# Patient Record
Sex: MALE | Race: WHITE | Employment: UNEMPLOYED | URBAN - METROPOLITAN AREA
[De-identification: names, ages, dates, MRNs, and addresses within clinical notes are randomized per-mention and may not be internally consistent; named-entity substitution may affect disease eponyms.]

---

## 2018-05-22 ENCOUNTER — OFFICE VISIT (OUTPATIENT)
Dept: INTERNAL MEDICINE CLINIC | Age: 18
End: 2018-05-22

## 2018-05-22 ENCOUNTER — HOSPITAL ENCOUNTER (OUTPATIENT)
Dept: LAB | Age: 18
Discharge: HOME | End: 2018-05-22

## 2018-05-22 VITALS
SYSTOLIC BLOOD PRESSURE: 104 MMHG | WEIGHT: 123 LBS | HEIGHT: 67 IN | DIASTOLIC BLOOD PRESSURE: 58 MMHG | OXYGEN SATURATION: 98 % | HEART RATE: 76 BPM | BODY MASS INDEX: 19.3 KG/M2 | RESPIRATION RATE: 18 BRPM | TEMPERATURE: 96.8 F

## 2018-05-22 DIAGNOSIS — Z00.00 ROUTINE GENERAL MEDICAL EXAMINATION AT A HEALTH CARE FACILITY: Primary | ICD-10-CM

## 2018-05-22 NOTE — MR AVS SNAPSHOT
Teresita Begum 
 
 
 [address] 
[phone] Patient: Lily Machado MRN: [id number] Visit Information Date & Time Provider Department Dept. Phone Encounter #  
 5/22/2018  2:30 PM Saravanan Zhang MD [facility] [phone] Upcoming Health Maintenance Date Due Hepatitis A Peds Age 1-18 (1 of 2 - Standard Series) 3/11/1999 DTaP/Tdap/Td series (1 - Tdap) 3/11/2005 HPV Age 9Y-34Y (1 of 1 - Male 3 Dose Series) 3/11/2009 Influenza Age 5 to Adult 8/1/2018 Allergies as of 5/22/2018  Review Complete On: 5/22/2018 By: Saravanan Zhang MD  
  
 Severity Noted Reaction Type Reactions Amoxicillin Low 01/16/2018    Rash Penicillins Low 01/16/2018    Rash Current Immunizations  Never Reviewed No immunizations on file. Not reviewed this visit You Were Diagnosed With   
  
 Codes Comments Routine general medical examination at a health care facility    -  Primary ICD-10-CM: Z00.00 ICD-9-CM: V70.0 Vitals BP Pulse Temp Resp Height(growth percentile) Weight(growth percentile) 104/58 (BP 1 Location: Left arm, BP Patient Position: Sitting) 76 96.8 °F (36 °C) (Oral) 18 5' 6.53\" (1.69 m) 123 lb (55.8 kg) SpO2 BMI Smoking Status 98% 19.53 kg/m2 Never Smoker Vitals History BMI and BSA Data Body Mass Index Body Surface Area  
 19.53 kg/m 2 1.62 m 2 Your Updated Medication List  
  
Notice  As of 5/22/2018  3:07 PM  
 You have not been prescribed any medications. To-Do List   
 Around 05/22/2018 Lab:  CBC WITH AUTOMATED DIFF Around 05/22/2018 Lab:  LIPID PANEL Around 05/22/2018 Lab:  METABOLIC PANEL, COMPREHENSIVE Patient Instructions Patient was given a copy of the Advanced Medical Directive form and understands to bring it in once completed. Health Maintenance Due Topic Date Due  
 Hepatitis A Peds Age 1-18 (1 of 2 - Standard Series) 03/11/1999  
 DTaP/Tdap/Td series (1 - Tdap) 03/11/2005  HPV Age 9Y-34Y (1 of 1 - Male 3 Dose Series) 03/11/2009 Introducing [facility]! [facility] introduces [employer] patient portal. Now you can access parts of your medical record, email your doctor's office, and request medication refills online. 1. In your internet browser, go to [email or web address] 2. Click on the First Time User? Click Here link in the Sign In box. You will see the New Member Sign Up page. 3. Enter your C[employer]ay Access Code exactly as it appears below. You will not need to use this code after youve completed the sign-up process. If you do not sign up before the expiration date, you must request a new code. · [employer] Access Code: [email or web address] Expires: 8/20/2018  2:06 PM 
 
4. Enter the last four digits of your Social Security Number (xxxx) and Date of Birth (mm/dd/yyyy) as indicated and click Submit. You will be taken to the next sign-up page. 5. Create a Coupay ID. This will be your Coupay login ID and cannot be changed, so think of one that is secure and easy to remember. 6. Create a [employer] password. You can change your password at any time. 7. Enter your Password Reset Question and Answer. This can be used at a later time if you forget your password. 8. Enter your e-mail address. You will receive e-mail notification when new information is available in [address] 9. Click Sign Up. You can now view and download portions of your medical record. 10. Click the Download Summary menu link to download a portable copy of your medical information. If you have questions, please visit the Frequently Asked Questions section of the [employer] website. Remember, [employer] is NOT to be used for urgent needs. For medical emergencies, dial 911. Now available from your iPhone and Android! Please provide this summary of care documentation to your next provider. Your primary care clinician is listed as [place]. Romelia Short. If you have any questions after today's visit, please call [phone].

## 2018-05-22 NOTE — PATIENT INSTRUCTIONS
Patient was given a copy of the Advanced Medical Directive form and understands to bring it in once completed.   Health Maintenance Due   Topic Date Due    Hepatitis A Peds Age 1-18 (1 of 2 - Standard Series) 03/11/1999    DTaP/Tdap/Td series (1 - Tdap) 03/11/2005    HPV Age 9Y-34Y (1 of 3 - Male 3 Dose Series) 03/11/2009

## 2018-05-22 NOTE — PROGRESS NOTES
Marcello Roxie 2000, is a 19 y.o. male, who is seen today for a new patient physical exam.  He does note that he had influenza followed by pneumonia in February and since then has not been quite himself. He normally does running and other exercise but has noted that sometimes he feels briefly lightheaded with his running or after his running but really has not slowed him down. He also notes that he gets what he thinks is heartburn with fast food and some greasy foods where he feels a vague discomfort in the subxiphoid area and it lasts for at least several minutes before clearing on its own. He has not tried antacids before or with symptoms. He has always been healthy otherwise. He would like some lab work done. He has no significant past medical history and no surgical history. He takes no medicine. He has no known drug allergies. He occasionally drinks alcohol and does not smoke cigarettes. He attends college at the [address] [place] [address] and hopes to attend dental school in the future. Visit Vitals    /58 (BP 1 Location: Left arm, BP Patient Position: Sitting)    Pulse 76    Temp 96.8 °F (36 °C) (Oral)    Resp 18    Ht 5' 6.53\" (1.69 m)    Wt 123 lb (55.8 kg)    SpO2 98%    BMI 19.53 kg/m2      Ear canals and tympanic membranes appear normal with good light reflex bilaterally. Oral cavity reveals no lesions. Neck reveals no adenopathy or thyromegaly. Carotids are 2+. Lungs are clear to percussion. Good breath sounds with no wheezing or crackles. Heart reveals a regular rhythm with normal S1 and S2 no murmur gallop click or rub. Apical impulse is in the fifth interspace medial to the midclavicular line. Abdomen is soft and nontender with no hepatosplenomegaly or masses and no bruits. Extremities reveal no clubbing cyanosis or edema. Pulses are 2+. There is no hernia testicles feel normal and are normal size. Rectal exam was not needed. Assessment: #1.   He sometimes feels a little lightheaded with or after exercise but no abnormalities on cardiac exam, no murmur extrasystoles or other abnormalities. This likely will clear on its own and have recommended he continue to exercise as usual.  #2.  Some reflux symptoms occasionally with greasy foods including fast food, have recommended he try using antacids such as Tums if symptoms occur or he may try generic Pepcid or generic Zantac 30-60 minutes before eating meals that he thinks might cause these symptoms. Follow-up as needed. We did draw some routine lab for him today and will call results when available    [place]. Ha Piña MD FACP    Please note: This document has been produced using voice recognition software. Unrecognized errors in transcription may be present.

## 2018-05-23 ENCOUNTER — HOSPITAL ENCOUNTER (OUTPATIENT)
Dept: LAB | Age: 18
Discharge: HOME | End: 2018-05-23
Payer: OTHER GOVERNMENT

## 2018-05-23 ENCOUNTER — TELEPHONE (OUTPATIENT)
Dept: INTERNAL MEDICINE CLINIC | Age: 18
End: 2018-05-23

## 2018-05-23 DIAGNOSIS — D50.9 MICROCYTIC ANEMIA: Primary | ICD-10-CM

## 2018-05-23 DIAGNOSIS — D50.9 MICROCYTIC ANEMIA: ICD-10-CM

## 2018-05-23 DIAGNOSIS — D56.0 ALPHA-THALASSEMIA (HCC): Primary | ICD-10-CM

## 2018-05-23 LAB
ALBUMIN SERPL-MCNC: 5 G/DL (ref 3.5–5.5)
ALBUMIN/GLOB SERPL: 2.1 {RATIO} (ref 1.2–2.2)
ALP SERPL-CCNC: 66 IU/L (ref 39–117)
ALT SERPL-CCNC: 23 IU/L (ref 0–44)
AST SERPL-CCNC: 26 IU/L (ref 0–40)
BASOPHILS # BLD AUTO: 0.1 X10E3/UL (ref 0–0.2)
BASOPHILS NFR BLD AUTO: 1 %
BILIRUB SERPL-MCNC: 0.6 MG/DL (ref 0–1.2)
BUN SERPL-MCNC: 15 MG/DL (ref 6–20)
BUN/CREAT SERPL: 17 (ref 9–20)
CALCIUM SERPL-MCNC: 9.8 MG/DL (ref 8.7–10.2)
CHLORIDE SERPL-SCNC: 100 MMOL/L (ref 96–106)
CHOLEST SERPL-MCNC: 142 MG/DL (ref 100–199)
CO2 SERPL-SCNC: 24 MMOL/L (ref 18–29)
CREAT SERPL-MCNC: 0.87 MG/DL (ref 0.76–1.27)
EOSINOPHIL # BLD AUTO: 0.2 X10E3/UL (ref 0–0.4)
EOSINOPHIL NFR BLD AUTO: 4 %
ERYTHROCYTE [DISTWIDTH] IN BLOOD BY AUTOMATED COUNT: 16.3 % (ref 12.3–15.4)
FERRITIN SERPL-MCNC: 82 NG/ML (ref 8–388)
GFR SERPLBLD CREATININE-BSD FMLA CKD-EPI: 124 ML/MIN/1.73
GFR SERPLBLD CREATININE-BSD FMLA CKD-EPI: 144 ML/MIN/1.73
GLOBULIN SER CALC-MCNC: 2.4 G/DL (ref 1.5–4.5)
GLUCOSE SERPL-MCNC: 71 MG/DL (ref 65–99)
HCT VFR BLD AUTO: 41.7 % (ref 37.5–51)
HDLC SERPL-MCNC: 50 MG/DL
HGB BLD-MCNC: 13.3 G/DL (ref 13–17.7)
IMM GRANULOCYTES # BLD: 0 X10E3/UL (ref 0–0.1)
IMM GRANULOCYTES NFR BLD: 0 %
INTERPRETATION, 910389: NORMAL
IRON SATN MFR SERPL: 30 %
IRON SERPL-MCNC: 100 UG/DL (ref 50–175)
LDLC SERPL CALC-MCNC: 69 MG/DL (ref 0–99)
LYMPHOCYTES # BLD AUTO: 1.8 X10E3/UL (ref 0.7–3.1)
LYMPHOCYTES NFR BLD AUTO: 26 %
MCH RBC QN AUTO: 19.5 PG (ref 26.6–33)
MCHC RBC AUTO-ENTMCNC: 31.9 G/DL (ref 31.5–35.7)
MCV RBC AUTO: 61 FL (ref 79–97)
MONOCYTES # BLD AUTO: 0.5 X10E3/UL (ref 0.1–0.9)
MONOCYTES NFR BLD AUTO: 7 %
NEUTROPHILS # BLD AUTO: 4.4 X10E3/UL (ref 1.4–7)
NEUTROPHILS NFR BLD AUTO: 62 %
PLATELET # BLD AUTO: 291 X10E3/UL (ref 150–379)
POTASSIUM SERPL-SCNC: 4 MMOL/L (ref 3.5–5.2)
PROT SERPL-MCNC: 7.4 G/DL (ref 6–8.5)
RBC # BLD AUTO: 6.81 X10E6/UL (ref 4.14–5.8)
SODIUM SERPL-SCNC: 142 MMOL/L (ref 134–144)
TIBC SERPL-MCNC: 330 UG/DL (ref 250–450)
TRIGL SERPL-MCNC: 114 MG/DL (ref 0–149)
VLDLC SERPL CALC-MCNC: 23 MG/DL (ref 5–40)
WBC # BLD AUTO: 6.9 X10E3/UL (ref 3.4–10.8)

## 2018-05-23 PROCEDURE — 82728 ASSAY OF FERRITIN: CPT | Performed by: INTERNAL MEDICINE

## 2018-05-23 PROCEDURE — 83540 ASSAY OF IRON: CPT | Performed by: INTERNAL MEDICINE

## 2018-05-23 PROCEDURE — 36415 COLL VENOUS BLD VENIPUNCTURE: CPT | Performed by: INTERNAL MEDICINE

## 2018-05-23 NOTE — PROGRESS NOTES
Please notify the patient that his lab is normal except the size of his blood cells is very small, either he has a genetic condition called thalassemia or he has iron deficiency. I would like him to have iron level checked on his blood either here or [facility], nonfasting, sometime within the next several days to clarify this issue. His cholesterol and sugar and all of his other labs are excellent.

## 2018-05-23 NOTE — TELEPHONE ENCOUNTER
----- Message from Simran Johnston MD sent at 5/23/2018  8:51 AM EDT -----  Please notify the patient that his lab is normal except the size of his blood cells is very small, either he has a genetic condition called thalassemia or he has iron deficiency. I would like him to have iron level checked on his blood [facility] or [facility], non[facility], sometime within the next several days to clarify this issue. His cholesterol and sugar and all of his other labs are excellent. Chief Complaint   Patient presents with    Labs     done 5-22-18     Patient reached and informed to have his Iron Levels rechecked, the order is in the system, he understands where to get the labs drawn and understands we will give him another call with those results when available.

## 2018-05-24 PROCEDURE — 99001 SPECIMEN HANDLING PT-LAB: CPT | Performed by: INTERNAL MEDICINE
